# Patient Record
Sex: FEMALE | Race: WHITE | NOT HISPANIC OR LATINO | Employment: UNEMPLOYED
[De-identification: names, ages, dates, MRNs, and addresses within clinical notes are randomized per-mention and may not be internally consistent; named-entity substitution may affect disease eponyms.]

---
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
## 2023-08-22 NOTE — ANESTHESIOLOGIST PRE-PROCEDURE CHART REVIEW
----- Message from MARY Braden sent at 8/22/2023  9:55 AM CDT -----  Recommend  macrobid 100 mg bid x 7 days    I confirm that I have reviewed the available information in the medical record prior to the scheduled surgery.

## 2023-09-13 NOTE — PROGRESS NOTES
9/14/2023  Martine Hamilton is a 18 y.o. y.o. female who presents for annual exam.   Periods are irregular, lasting 3 days. Dysmenorrhea:moderate, occurring first 1-2 days of flow. Cyclic symptoms include pelvic pain. No intermenstrual bleeding, spotting, or discharge.    The patient is sexually active with female partner. GYN screening history: last pap: was normal. Domestic violence: no.     Current contraception: none  History of abnormal Pap smear: no  Family history of uterine or ovarian cancer: no  Regular self breast exam: no  History of abnormal mammogram: no  Family history of breast cancer: yes - PGM-40's?  History of abnormal lipids: no     Menstrual History:  OB History     G0          Patient's last menstrual period was 08/24/2023.         Review of Systems and Physical Exam  The following have been reviewed and updated as appropriate in this visit: Allergies  Meds  Problems  .     Vitals:    09/14/23 1322   BP: 116/82     Weight: 203 pounds   HPI  17 yo G0 new patient presents for routine gyn annual who notes worsening dysmenorrhea -tried ocps in the past but didn't help with dysmenorrhea. +dyspaurenia -has female sex partner   Review of Systems  No HA/CP/SOB/abdominal/pelvic pain/dysuria/hematuria/incontinence constipation/diarrhea/vaginal discharge/dyspareunia/  OBGyn Exam  Neck: No thyromegaly or lumps  Heart: Regular rate and rhythm  Lungs: Clear to auscultation bilaterally   Abdomen: Soft, nontender, no masses  Extremities: No calf tenderness or edema  Speculum exam: No vaginal, vulvar, cervical lesions  Vaginal exam: Uterus normal size; no adnexal masses  Breast: No lumps; no lymphadenopathy    Assessment and Plan:  Diagnoses and all orders for this visit:    Well woman exam with routine gynecological exam  -     Chlamydia/GC RNA:ThinPrep/Urine/Swab    Irregular menstrual bleeding  -     FSH/LH; Future  -     ESTRADIOL; Future  -     Progesterone; Future  -     TSH w reflex FT4; Future  -      Insulin; Future  -     Hemoglobin A1c; Future    Pelvic pain in female  -     US PELVIS TRANSABDOMINAL & TRANSVAGINAL; Future    .  All questions answered..  Follow up in 1 year   Eric Cuello MD

## 2023-09-14 ENCOUNTER — OFFICE VISIT (OUTPATIENT)
Dept: OBSTETRICS AND GYNECOLOGY | Facility: CLINIC | Age: 18
End: 2023-09-14
Payer: COMMERCIAL

## 2023-09-14 VITALS
BODY MASS INDEX: 33.82 KG/M2 | DIASTOLIC BLOOD PRESSURE: 82 MMHG | HEIGHT: 65 IN | WEIGHT: 203 LBS | SYSTOLIC BLOOD PRESSURE: 116 MMHG

## 2023-09-14 DIAGNOSIS — Z01.419 WELL WOMAN EXAM WITH ROUTINE GYNECOLOGICAL EXAM: ICD-10-CM

## 2023-09-14 DIAGNOSIS — R10.2 PELVIC PAIN IN FEMALE: Primary | ICD-10-CM

## 2023-09-14 DIAGNOSIS — N92.6 IRREGULAR MENSTRUAL BLEEDING: ICD-10-CM

## 2023-09-14 PROCEDURE — 3008F BODY MASS INDEX DOCD: CPT | Performed by: OBSTETRICS & GYNECOLOGY

## 2023-09-14 PROCEDURE — 99214 OFFICE O/P EST MOD 30 MIN: CPT | Mod: 25 | Performed by: OBSTETRICS & GYNECOLOGY

## 2023-09-14 PROCEDURE — S0610 ANNUAL GYNECOLOGICAL EXAMINA: HCPCS | Performed by: OBSTETRICS & GYNECOLOGY

## 2023-09-14 RX ORDER — ESCITALOPRAM OXALATE 10 MG/1
10 TABLET ORAL DAILY
COMMUNITY

## 2023-09-16 LAB
C TRACH RRNA SPEC QL NAA+PROBE: NEGATIVE
N GONORRHOEA RRNA SPEC QL NAA+PROBE: NEGATIVE

## 2023-11-13 ENCOUNTER — HOSPITAL ENCOUNTER (OUTPATIENT)
Dept: RADIOLOGY | Facility: HOSPITAL | Age: 18
Discharge: HOME | End: 2023-11-13
Attending: OBSTETRICS & GYNECOLOGY
Payer: COMMERCIAL

## 2023-11-13 DIAGNOSIS — R10.2 PELVIC PAIN IN FEMALE: ICD-10-CM

## 2023-11-13 PROCEDURE — 76830 TRANSVAGINAL US NON-OB: CPT

## 2023-11-21 NOTE — PROGRESS NOTES
Visit Date: 11/22/2023  Janell Hamilton is 18 y.o. adult presenting today for Follow-up ([place] discharge . /Pelvic pain not around period )    The following have been reviewed and updated as appropriate in this visit:  Allergies  Meds  Problems       Visit Vitals  /72 (BP Location: Left upper arm, Patient Position: Sitting)   Wt 93.2 kg (205 lb 6.4 oz)   LMP 10/29/2023   BMI 34.18 kg/m²     Menstrual History:  OB History    No obstetric history on file.        Patient's last menstrual period was 10/29/2023.       Medications:   Current Outpatient Medications:     albuterol HFA (VENTOLIN HFA) 90 mcg/actuation inhaler, Inhale 2 puffs as needed., Disp: , Rfl:     cholecalciferol, vitamin D3, 1,000 unit (25 mcg) tablet, Take 2,000 Units by mouth daily., Disp: , Rfl:     escitalopram (LEXAPRO) 10 mg tablet, Take 10 mg by mouth daily., Disp: , Rfl:     lamoTRIgine (LaMICtal) 150 mg tablet, Take 100 mg by mouth nightly., Disp: , Rfl:     levocetirizine (XYZAL) 5 mg tablet, Take 1 capsule by mouth nightly., Disp: , Rfl:     omeprazole (PriLOSEC) 40 mg capsule, Take 40 mg by mouth nightly., Disp: , Rfl:     ondansetron (ZOFRAN) 4 mg tablet, Take 1 tablet (4 mg total) by mouth every 8 (eight) hours as needed for nausea or vomiting for up to 7 days., Disp: 21 tablet, Rfl: 0    Allergies: is allergic to cefprozil.     Past Medical History:  has a past medical history of Asthma, Bipolar disease, chronic (CMS/HCC), GERD (gastroesophageal reflux disease), PONV (postoperative nausea and vomiting), and Seasonal allergies.  Past Surgical History:  has a past surgical history that includes Tonsillectomy (08/2020) and [place] tooth extraction.  Family History: family history includes No Known Problems in Janell Hamilton's biological mother; Seizures in Janell Hamilton's biological father.  Social History:   Social History     Tobacco Use    Smoking status: Never    Smokeless tobacco: Never   Vaping Use    Vaping Use: Never used    Substance Use Topics    Alcohol use: Never    Drug use: Never     The patient is sexually active. GYN screening history: no prior history of gyn screening tests. Domestic violence: no.     HPI   17 yo G0 presents for follow up due to pelvic pain and recent pelvic u/s that showed:    Uterus: The uterus measures 2.7 x  3.8 x  8.7 cm. The parenchyma is homogeneous  without focal mass.     Endometrium: The endometrial stripe measures 0.9 cm  in maximum thickness.  There is no endometrial mass or abnormal vascularity of the endometrium.     Right ovary: The right ovary measures 2.9  x 3.4 x 4.4 cm. The right ovary there  is a homogeneously complex lesion measuring 2.2 x 3.0 x 3.1 cm.  This has  imaging characteristics most in keeping with endometrioma.     Left ovary: The left ovary measures 2.4 x 2.1 x 3.0  cm.   The left ovary is  normal in size and echotexture.    Having dysmenorrhea but now more frequent pain in the last 2 months more sporadic; now unable to engage in intercourse ; menses are lasting longer than before . Notes that these symptoms were controlled while on ocps.   Review of Systems   All other negative  OBGyn Exam   Deferred    Assessment and Plan:  Diagnoses and all orders for this visit:    Endometriosis (Primary)    Vaginal discharge  -     NUSWAB VAGINITIS PLUS (VG+) [facility]    Screening examination for STD (sexually transmitted disease)  -     NUSWAB VAGINITIS PLUS (VG+) [facility]      Discussed restarting ocps vs patch or nuvaring. Pt will review options and reach out   Return in about 6 months (around 5/22/2024).  Eric Cuello MD

## 2023-11-22 ENCOUNTER — OFFICE VISIT (OUTPATIENT)
Dept: OBSTETRICS AND GYNECOLOGY | Facility: CLINIC | Age: 18
End: 2023-11-22
Payer: COMMERCIAL

## 2023-11-22 VITALS — DIASTOLIC BLOOD PRESSURE: 72 MMHG | SYSTOLIC BLOOD PRESSURE: 116 MMHG | BODY MASS INDEX: 34.18 KG/M2 | WEIGHT: 205.4 LBS

## 2023-11-22 DIAGNOSIS — N80.9 ENDOMETRIOSIS: Primary | ICD-10-CM

## 2023-11-22 DIAGNOSIS — Z11.3 SCREENING EXAMINATION FOR STD (SEXUALLY TRANSMITTED DISEASE): ICD-10-CM

## 2023-11-22 DIAGNOSIS — N89.8 VAGINAL DISCHARGE: ICD-10-CM

## 2023-11-22 PROCEDURE — 3008F BODY MASS INDEX DOCD: CPT | Performed by: OBSTETRICS & GYNECOLOGY

## 2023-11-22 PROCEDURE — 99214 OFFICE O/P EST MOD 30 MIN: CPT | Performed by: OBSTETRICS & GYNECOLOGY

## 2023-11-26 LAB
A VAGINAE DNA VAG QL NAA+PROBE: ABNORMAL SCORE
BVAB2 DNA VAG QL NAA+PROBE: ABNORMAL SCORE
C ALBICANS DNA VAG QL NAA+PROBE: NEGATIVE
C GLABRATA DNA VAG QL NAA+PROBE: NEGATIVE
C TRACH DNA VAG QL NAA+PROBE: NEGATIVE
MEGA1 DNA VAG QL NAA+PROBE: ABNORMAL SCORE
N GONORRHOEA DNA VAG QL NAA+PROBE: NEGATIVE
T VAGINALIS DNA VAG QL NAA+PROBE: NEGATIVE

## 2023-11-26 RX ORDER — METRONIDAZOLE 500 MG/1
500 TABLET ORAL 2 TIMES DAILY
Qty: 14 TABLET | Refills: 0 | Status: SHIPPED | OUTPATIENT
Start: 2023-11-26 | End: 2023-12-03

## 2024-08-05 ENCOUNTER — APPOINTMENT (RX ONLY)
Dept: URBAN - METROPOLITAN AREA CLINIC 28 | Facility: CLINIC | Age: 19
Setting detail: DERMATOLOGY
End: 2024-08-05

## 2024-08-05 DIAGNOSIS — L60.3 NAIL DYSTROPHY: ICD-10-CM

## 2024-08-05 DIAGNOSIS — D17 BENIGN LIPOMATOUS NEOPLASM: ICD-10-CM

## 2024-08-05 DIAGNOSIS — R21 RASH AND OTHER NONSPECIFIC SKIN ERUPTION: ICD-10-CM | Status: INADEQUATELY CONTROLLED

## 2024-08-05 DIAGNOSIS — L21.8 OTHER SEBORRHEIC DERMATITIS: ICD-10-CM

## 2024-08-05 PROBLEM — D17.23 BENIGN LIPOMATOUS NEOPLASM OF SKIN AND SUBCUTANEOUS TISSUE OF RIGHT LEG: Status: ACTIVE | Noted: 2024-08-05

## 2024-08-05 PROBLEM — L60.9 NAIL DISORDER, UNSPECIFIED: Status: ACTIVE | Noted: 2024-08-05

## 2024-08-05 PROCEDURE — ? DEFER

## 2024-08-05 PROCEDURE — ? NAIL CLIPPING FOR PATHOLOGY

## 2024-08-05 PROCEDURE — ? PRESCRIPTION

## 2024-08-05 PROCEDURE — ? COUNSELING

## 2024-08-05 PROCEDURE — 99204 OFFICE O/P NEW MOD 45 MIN: CPT

## 2024-08-05 PROCEDURE — ? PRESCRIPTION MEDICATION MANAGEMENT

## 2024-08-05 RX ORDER — CLOBETASOL PROPIONATE 0.5 MG/ML
SOLUTION TOPICAL QHS
Qty: 50 | Refills: 6 | Status: ERX | COMMUNITY
Start: 2024-08-05

## 2024-08-05 RX ORDER — KETOCONAZOLE 20 MG/ML
SHAMPOO, SUSPENSION TOPICAL
Qty: 120 | Refills: 3 | Status: ERX | COMMUNITY
Start: 2024-08-05

## 2024-08-05 RX ORDER — TRIAMCINOLONE ACETONIDE 1 MG/G
OINTMENT TOPICAL BID
Qty: 80 | Refills: 2 | Status: ERX | COMMUNITY
Start: 2024-08-05

## 2024-08-05 RX ADMIN — CLOBETASOL PROPIONATE: 0.5 SOLUTION TOPICAL at 00:00

## 2024-08-05 RX ADMIN — KETOCONAZOLE: 20 SHAMPOO, SUSPENSION TOPICAL at 00:00

## 2024-08-05 RX ADMIN — TRIAMCINOLONE ACETONIDE: 1 OINTMENT TOPICAL at 00:00

## 2024-08-05 ASSESSMENT — LOCATION SIMPLE DESCRIPTION DERM
LOCATION SIMPLE: RIGHT FOREHEAD
LOCATION SIMPLE: RIGHT PRETIBIAL REGION
LOCATION SIMPLE: LEFT 3RD TOE

## 2024-08-05 ASSESSMENT — LOCATION ZONE DERM
LOCATION ZONE: LEG
LOCATION ZONE: TOENAIL
LOCATION ZONE: FACE

## 2024-08-05 ASSESSMENT — LOCATION DETAILED DESCRIPTION DERM
LOCATION DETAILED: RIGHT SUPERIOR MEDIAL FOREHEAD
LOCATION DETAILED: RIGHT PROXIMAL PRETIBIAL REGION
LOCATION DETAILED: LEFT 3RD TOENAIL

## 2024-08-05 NOTE — PROCEDURE: PRESCRIPTION MEDICATION MANAGEMENT
Detail Level: Zone
Render In Strict Bullet Format?: No
Initiate Treatment: triamcinolone acetonide 0.1 % topical ointment BID: Apply a thin layer to the AA  (do not apply to face) bid prn flare
Initiate Treatment: ketoconazole 2 % shampoo: Lather the hair with the shampoo QHair Wash and then leave in for at least 5-10 minutes before washing out\\n\\nclobetasol 0.05 % scalp solution: Apply a few scattered drops onto itchy areas of the scalp and gently massage in QHS

## 2025-06-25 ENCOUNTER — HOSPITAL ENCOUNTER (EMERGENCY)
Facility: HOSPITAL | Age: 20
Discharge: LEFT BEFORE DISCHARGE | End: 2025-06-25
Payer: COMMERCIAL

## (undated) DEVICE — TIP BOVIE BLADE COATED INSULATED 2.75IN

## (undated) DEVICE — COVER LIGHTHANDLE

## (undated) DEVICE — BLADE SCALPEL #10

## (undated) DEVICE — ***USE 138597*** SUTURE SILK  0   678G

## (undated) DEVICE — GLOVE SZ 8 LINER PROTEXIS PI BL

## (undated) DEVICE — MANIFOLD SINGLE PORT NEPTUNE

## (undated) DEVICE — GLOVE SURG PROTEXIS PF 7.5

## (undated) DEVICE — ***USE 138531*** SUTURE VICRYL 2-0 J266H CP-1 UNDYED

## (undated) DEVICE — BLADE SCALPEL #11

## (undated) DEVICE — PACK RFID MAJOR BREAST

## (undated) DEVICE — BLADE SCALPEL #15

## (undated) DEVICE — BANDAGE KERLIX STERILE 4.5INX 4.1YDS

## (undated) DEVICE — STAPLER SKIN

## (undated) DEVICE — TUBING SMOKE EVAC PENCIL COATED

## (undated) DEVICE — TUBING ASPIRATION TAPERED 12FT LONG

## (undated) DEVICE — GOWN SURGICAL REINFORCED X-LAR

## (undated) DEVICE — SUPPORT BREAST LG 36-42

## (undated) DEVICE — SEPPS BENZOIN TINCTURE

## (undated) DEVICE — ***USE 57698*** SLEEVE FLOWTRON DVT CALF SINGLE USE

## (undated) DEVICE — SUTURE MONOCRYL 4-0 Y426H PS-2 27IN

## (undated) DEVICE — CANISTER SUCTION 3000CC BEMIS

## (undated) DEVICE — STERISTRIP 1/2INX4IN

## (undated) DEVICE — ADHESIVE SKIN LIQUIBAND EXCEED .8GM

## (undated) DEVICE — DRAIN PENROSE 1IN

## (undated) DEVICE — SUTURE VICRYL 3-0 J936H PS-1 UND

## (undated) DEVICE — STAPLER INSORB 25 SUBCUTICULAR SKIN

## (undated) DEVICE — PAD GROUND ELECTROSURGICAL W/CORD

## (undated) DEVICE — SUTURE PDS II  0 Z340H CT-1 27IN VIOLET

## (undated) DEVICE — APPLICATOR CHLORAPREP 26ML ORANGE TINT